# Patient Record
Sex: MALE | Race: WHITE | NOT HISPANIC OR LATINO | URBAN - METROPOLITAN AREA
[De-identification: names, ages, dates, MRNs, and addresses within clinical notes are randomized per-mention and may not be internally consistent; named-entity substitution may affect disease eponyms.]

---

## 2019-07-30 RX ORDER — ALBUTEROL SULFATE 90 UG/1
AEROSOL, METERED RESPIRATORY (INHALATION)
COMMUNITY
Start: 2016-06-20 | End: 2019-07-31

## 2019-07-30 RX ORDER — PREDNISONE 10 MG/1
TABLET ORAL
COMMUNITY
Start: 2016-06-20 | End: 2019-07-31

## 2019-07-31 ENCOUNTER — OFFICE VISIT (OUTPATIENT)
Dept: FAMILY MEDICINE CLINIC | Facility: CLINIC | Age: 54
End: 2019-07-31
Payer: COMMERCIAL

## 2019-07-31 VITALS
OXYGEN SATURATION: 97 % | TEMPERATURE: 97.7 F | RESPIRATION RATE: 16 BRPM | WEIGHT: 205.6 LBS | HEIGHT: 69 IN | DIASTOLIC BLOOD PRESSURE: 80 MMHG | BODY MASS INDEX: 30.45 KG/M2 | HEART RATE: 80 BPM | SYSTOLIC BLOOD PRESSURE: 130 MMHG

## 2019-07-31 DIAGNOSIS — H65.92 MIDDLE EAR EFFUSION, LEFT: Primary | ICD-10-CM

## 2019-07-31 PROCEDURE — 3008F BODY MASS INDEX DOCD: CPT | Performed by: FAMILY MEDICINE

## 2019-07-31 PROCEDURE — 99213 OFFICE O/P EST LOW 20 MIN: CPT | Performed by: FAMILY MEDICINE

## 2019-07-31 RX ORDER — NEOMYCIN SULFATE, POLYMYXIN B SULFATE AND HYDROCORTISONE 10; 3.5; 1 MG/ML; MG/ML; [USP'U]/ML
SUSPENSION/ DROPS AURICULAR (OTIC)
COMMUNITY
Start: 2019-07-15 | End: 2019-07-31

## 2019-07-31 RX ORDER — FLUTICASONE PROPIONATE 50 MCG
SPRAY, SUSPENSION (ML) NASAL
Qty: 1 BOTTLE | Refills: 0 | Status: SHIPPED | OUTPATIENT
Start: 2019-07-31

## 2019-07-31 NOTE — PROGRESS NOTES
Assessment/Plan:       Problem List Items Addressed This Visit     None      Visit Diagnoses     Middle ear effusion, left    -  Primary    Relevant Medications    fluticasone (FLONASE) 50 mcg/act nasal spray        Patient Instructions   Try tylenol cold/sinus (or something similar with a decongestant)  Try peroxide/water (50/50) in your ear canal for 5 minutes and then let drain out  Use the nasal spray 2 sprays to each nostril once a day  Call if you aren't feeling better  Subjective:     Delonte Barahona is a 48 y o  male here today with chief complaint below:  Chief Complaint   Patient presents with   Salomon Bauer     had ear infection on left ear and still can't hear out of let ear  going on 3 weeks now  denies pain anymore  - CC above per clinical staff and reviewed  HPI:    Three weeks ago, had bad ear pain- [facility]   Was given ear drops  Was told he should improve in a few days  Otitis externa of the L ear  Pain improved, but still very clogged  Hearing is muffled  No congestion, no throat pain, no cough  No fever  The following portions of the patient's history were reviewed and updated as appropriate: allergies, current medications, past family history, past medical history, past social history, past surgical history and problem list     ROS:  Review of Systems   No fever, chills, congestion, chest pain, shortness of breath, nausea, vomiting, diarrhea, constipation, blood in stool, urinary concerns, mood changes  Rest of ROS neg except as above  Objective:      /80   Pulse 80   Temp 97 7 °F (36 5 °C) (Tympanic)   Resp 16   Ht 5' 8 5" (1 74 m)   Wt 93 3 kg (205 lb 9 6 oz)   SpO2 97%   BMI 30 80 kg/m²   BP Readings from Last 3 Encounters:   07/31/19 130/80     Wt Readings from Last 3 Encounters:   07/31/19 93 3 kg (205 lb 9 6 oz)               Physical Exam:   Physical Exam   Constitutional: He is oriented to person, place, and time   He appears well-developed and well-nourished  HENT:   Head: Normocephalic and atraumatic  R TM normal,  L canal small amt debris near TM, no occlusion of canal, TM with mod effusion, no erythema   Eyes: Conjunctivae are normal    Cardiovascular: Normal rate, regular rhythm and normal heart sounds  No murmur heard  Pulmonary/Chest: Effort normal and breath sounds normal  He has no wheezes  Lymphadenopathy:     He has no cervical adenopathy  Neurological: He is alert and oriented to person, place, and time  Psychiatric: He has a normal mood and affect  His behavior is normal    Nursing note and vitals reviewed  BMI Counseling: Body mass index is 30 8 kg/m²  Discussed the patient's BMI with him  The BMI is above average  BMI counseling and education was provided to the patient  Nutrition recommendations include consuming healthier snacks

## 2019-07-31 NOTE — PATIENT INSTRUCTIONS
Try tylenol cold/sinus (or something similar with a decongestant)  Try peroxide/water (50/50) in your ear canal for 5 minutes and then let drain out  Use the nasal spray 2 sprays to each nostril once a day  Call if you aren't feeling better

## 2019-09-04 ENCOUNTER — OFFICE VISIT (OUTPATIENT)
Dept: FAMILY MEDICINE CLINIC | Facility: CLINIC | Age: 54
End: 2019-09-04
Payer: COMMERCIAL

## 2019-09-04 VITALS
RESPIRATION RATE: 18 BRPM | OXYGEN SATURATION: 96 % | DIASTOLIC BLOOD PRESSURE: 82 MMHG | SYSTOLIC BLOOD PRESSURE: 124 MMHG | HEART RATE: 82 BPM | HEIGHT: 69 IN | TEMPERATURE: 97.9 F | WEIGHT: 210.2 LBS | BODY MASS INDEX: 31.13 KG/M2

## 2019-09-04 DIAGNOSIS — H91.92 HEARING LOSS OF LEFT EAR, UNSPECIFIED HEARING LOSS TYPE: ICD-10-CM

## 2019-09-04 DIAGNOSIS — H65.92 MIDDLE EAR EFFUSION, LEFT: Primary | ICD-10-CM

## 2019-09-04 PROCEDURE — 99213 OFFICE O/P EST LOW 20 MIN: CPT | Performed by: FAMILY MEDICINE

## 2019-09-04 PROCEDURE — 3008F BODY MASS INDEX DOCD: CPT | Performed by: FAMILY MEDICINE

## 2019-09-06 PROBLEM — K85.90 PANCREATITIS: Status: ACTIVE | Noted: 2019-04-19

## 2019-09-06 NOTE — PROGRESS NOTES
Assessment/Plan:       Problem List Items Addressed This Visit     None      Visit Diagnoses     Middle ear effusion, left    -  Primary    Relevant Orders    Ambulatory Referral to Otolaryngology    Hearing loss of left ear, unspecified hearing loss type        Relevant Orders    Ambulatory Referral to Otolaryngology      Notes that he had initially been dx with otitis media L and I have seen him now x 2 since then with effusion, hearing difficulty and bothersome clogged feeling in the L ear  No improvement with one month flonase  Suggest ENT for eval-- referral given  Subjective:     Hernandez Odell is a 48 y o  male here today with chief complaint below:  Chief Complaint   Patient presents with    Earache     x3 weeks ago since infection  left ear is still muffled  denies any pain  - CC above per clinical staff and reviewed  HPI:    Pt here w concern that his L ear is still not improving  No pain but still clogged and bothersome  Hearing muffled  No fever  No drainage  No other upper respiratory infection sx  Used flonase since last OV 5 weeks ago but no improvement  Sx now for eight weeks    The following portions of the patient's history were reviewed and updated as appropriate: allergies, current medications, past family history, past medical history, past social history, past surgical history and problem list     ROS:  Review of Systems   As above  Objective:      /82   Pulse 82   Temp 97 9 °F (36 6 °C) (Tympanic)   Resp 18   Ht 5' 8 5" (1 74 m)   Wt 95 3 kg (210 lb 3 2 oz)   SpO2 96%   BMI 31 49 kg/m²   BP Readings from Last 3 Encounters:   09/04/19 124/82   07/31/19 130/80     Wt Readings from Last 3 Encounters:   09/04/19 95 3 kg (210 lb 3 2 oz)   07/31/19 93 3 kg (205 lb 9 6 oz)               Physical Exam:   Physical Exam   Constitutional: He appears well-developed and well-nourished  HENT:   Head: Normocephalic and atraumatic     TMs with b/l effusions, no erythema or bulging  Canals clear b/l  Nontender to tragus movement  Normal oropharynx   Eyes: Conjunctivae are normal    Cardiovascular: Normal rate, regular rhythm and normal heart sounds  Pulmonary/Chest: Effort normal and breath sounds normal  He has no wheezes  He has no rales  Lymphadenopathy:     He has no cervical adenopathy  Psychiatric: He has a normal mood and affect  His behavior is normal    Nursing note and vitals reviewed

## 2020-09-03 ENCOUNTER — TELEPHONE (OUTPATIENT)
Dept: FAMILY MEDICINE CLINIC | Facility: CLINIC | Age: 55
End: 2020-09-03

## 2020-09-03 NOTE — TELEPHONE ENCOUNTER
Ok to return to work- family members ill but negative for covid x 2  Would suggest he wait until symptoms improve, however

## 2020-09-03 NOTE — TELEPHONE ENCOUNTER
Prince Giles needs a work note because family was being tested  He also had sore throat, cough and runny nose  Dates that he was out until the tests came back were  9/1/20 to 9/03/20  Grandchild and daughter were negative    They live together    Caller:Josué  Phone#: [phone]

## 2020-09-03 NOTE — TELEPHONE ENCOUNTER
Placed call to patient and left non-detailed message for patient to return call  Calling to inform patient that he should wait until his symptoms subside so he can go back to work  Also where would he like note faxed to since he is not active on [employer]

## 2020-09-03 NOTE — LETTER
September 4, 2020     Patient: Brandon Freed   YOB: 1965       To Whom it May Concern:    Brandon Freed is under my professional care  Please excuse patient from work during the following dates: September 1, 2020 through September 3, 2020  He may return to work on September 4, 2020  If you have any questions or concerns, please don't hesitate to call           Sincerely,          [id number] Dad: No Recipients

## 2020-09-04 NOTE — TELEPHONE ENCOUNTER
Everyone is feeling better    He is returning to work today 9/4/2020, he has been out since Tuesday 9/1/2020, please fax the note to [phone]

## 2021-03-10 DIAGNOSIS — Z23 ENCOUNTER FOR IMMUNIZATION: ICD-10-CM

## 2022-12-12 ENCOUNTER — OFFICE VISIT (OUTPATIENT)
Dept: URGENT CARE | Facility: CLINIC | Age: 57
End: 2022-12-12

## 2022-12-12 VITALS
TEMPERATURE: 98.1 F | OXYGEN SATURATION: 98 % | RESPIRATION RATE: 18 BRPM | BODY MASS INDEX: 30.71 KG/M2 | HEART RATE: 80 BPM | WEIGHT: 205 LBS

## 2022-12-12 DIAGNOSIS — J06.9 VIRAL UPPER RESPIRATORY TRACT INFECTION: Primary | ICD-10-CM

## 2022-12-12 DIAGNOSIS — R05.1 ACUTE COUGH: ICD-10-CM

## 2022-12-12 RX ORDER — BENZONATATE 100 MG/1
200 CAPSULE ORAL 3 TIMES DAILY PRN
Qty: 20 CAPSULE | Refills: 0 | Status: SHIPPED | OUTPATIENT
Start: 2022-12-12

## 2022-12-12 NOTE — PATIENT INSTRUCTIONS
Start tessalon perles as needed for cough  Vitamin D3 2000 IU daily  Vitamin C 1000mg twice per day  Multivitamin daily  Fluids and rest  Over the counter cold medication as needed (EX: Coricidin HBP, tylenol/motrin)  Follow up with PCP in 3-5 days  Proceed to ER if symptoms worsen

## 2022-12-12 NOTE — PROGRESS NOTES
[facility]        NAME: Chucho Hernandez is a 56 y o  male  : 1965    MRN: [id number]  DATE: 2022  TIME: 2:21 PM    Assessment and Plan   Viral upper respiratory tract infection [J06 9]  1  Viral upper respiratory tract infection        2  Acute cough  Covid/Flu-Office Collect    benzonatate (TESSALON PERLES) 100 mg capsule            Patient Instructions     Start tessalon perles as needed for cough  Vitamin D3 2000 IU daily  Vitamin C 1000mg twice per day  Multivitamin daily  Fluids and rest  Over the counter cold medication as needed (EX: Coricidin HBP, tylenol/motrin)  Follow up with PCP in 3-5 days  Proceed to ER if symptoms worsen  Chief Complaint     Chief Complaint   Patient presents with   • Cough     X 3 days         History of Present Illness       Patient is a 74-year-old male with no significant PMH presenting in the clinic today for cold symptoms x 2 days  Admits chills, dry cough, sore throat, chest soreness, rhinorrhea  Patient admits his symptoms have been gradually improving since symptom onset  Denies fever, ear pain, congestion, sinus pain/pressur, chest pain, SOB, abdominal pain, n/v/d, body aches, and headache  Admits to use of DayQuil with moderate symptom relief  Denies recent sick contacts  Patient is vaccinated for covid  Patient is not vaccinated for the flu  Review of Systems   Review of Systems   Constitutional: Positive for chills  Negative for fatigue and fever  HENT: Positive for rhinorrhea and sore throat  Negative for congestion, ear pain, postnasal drip, sinus pressure and sinus pain  Respiratory: Positive for cough  Negative for shortness of breath  Cardiovascular: Negative for chest pain  Gastrointestinal: Negative for abdominal pain, diarrhea, nausea and vomiting  Musculoskeletal: Negative for myalgias  Skin: Negative for rash  Neurological: Negative for headaches           Current Medications       Current Outpatient Medications:   •  benzonatate (TESSALON PERLES) 100 mg capsule, Take 2 capsules (200 mg total) by mouth 3 (three) times a day as needed for cough, Disp: 20 capsule, Rfl: 0  •  fluticasone (FLONASE) 50 mcg/act nasal spray, 2 sprays per nostril once a day (Patient not taking: Reported on 12/12/2022), Disp: 1 Bottle, Rfl: 0    Current Allergies     Allergies as of 12/12/2022   • (No Known Allergies)            The following portions of the patient's history were reviewed and updated as appropriate: allergies, current medications, past family history, past medical history, past social history, past surgical history and problem list      Past Medical History:   Diagnosis Date   • Psoriasis        Past Surgical History:   Procedure Laterality Date   • NASAL SEPTUM SURGERY         History reviewed  No pertinent family history  Medications have been verified  Objective   Pulse 80   Temp 98 1 °F (36 7 °C)   Resp 18   Wt 93 kg (205 lb)   SpO2 98%   BMI 30 71 kg/m²        Physical Exam     Physical Exam  Vitals reviewed  Constitutional:       General: He is not in acute distress  Appearance: Normal appearance  He is normal weight  He is not ill-appearing  HENT:      Head: Normocephalic and atraumatic  Right Ear: Hearing, tympanic membrane, ear canal and external ear normal  No middle ear effusion  There is no impacted cerumen  Tympanic membrane is not erythematous or bulging  Left Ear: Hearing, tympanic membrane, ear canal and external ear normal   No middle ear effusion  There is no impacted cerumen  Tympanic membrane is not erythematous or bulging  Nose: Nose normal  No congestion or rhinorrhea  Right Sinus: No maxillary sinus tenderness or frontal sinus tenderness  Left Sinus: No maxillary sinus tenderness or frontal sinus tenderness  Mouth/Throat:      Lips: Pink  Mouth: Mucous membranes are moist       Pharynx: Oropharynx is clear  Uvula midline   Posterior oropharyngeal erythema present  No pharyngeal swelling or oropharyngeal exudate  Tonsils: No tonsillar exudate or tonsillar abscesses  1+ on the right  1+ on the left  Eyes:      General:         Right eye: No discharge  Left eye: No discharge  Conjunctiva/sclera: Conjunctivae normal    Cardiovascular:      Rate and Rhythm: Normal rate and regular rhythm  Pulses: Normal pulses  Heart sounds: Normal heart sounds  No murmur heard  No friction rub  No gallop  Pulmonary:      Effort: Pulmonary effort is normal       Breath sounds: Normal breath sounds  No wheezing, rhonchi or rales  Abdominal:      General: Abdomen is flat  Bowel sounds are normal  There is no distension  Palpations: Abdomen is soft  Tenderness: There is no abdominal tenderness  There is no guarding  Musculoskeletal:      Cervical back: Normal range of motion and neck supple  No tenderness  Lymphadenopathy:      Cervical: No cervical adenopathy  Skin:     General: Skin is warm  Capillary Refill: Capillary refill takes less than 2 seconds  Neurological:      Mental Status: He is alert     Psychiatric:         Mood and Affect: Mood normal          Behavior: Behavior normal

## 2022-12-12 NOTE — LETTER
[place] [address]  Dept: [phone]    December 12, 2022    Patient: Prisca Mondragon  YOB: 1965    Prisca Mondragon was seen and evaluated at our [facility]  Please note if Covid and Flu tests are negative, they may return to work on 12/14/2022 when fever free for 24 hours without the use of a fever reducing agent  If Covid or Flu test is positive, they may return to work on 12/16/2022, as this is 5 days from the onset of symptoms  Upon return, they must then adhere to strict masking for an additional 5 days      Sincerely,    Negro Dorantes PA-C

## 2022-12-13 LAB
FLUAV RNA RESP QL NAA+PROBE: NEGATIVE
FLUBV RNA RESP QL NAA+PROBE: NEGATIVE
SARS-COV-2 RNA RESP QL NAA+PROBE: NEGATIVE